# Patient Record
Sex: FEMALE | Race: WHITE
[De-identification: names, ages, dates, MRNs, and addresses within clinical notes are randomized per-mention and may not be internally consistent; named-entity substitution may affect disease eponyms.]

---

## 2018-06-21 ENCOUNTER — HOSPITAL ENCOUNTER (INPATIENT)
Dept: HOSPITAL 54 - GPS | Age: 78
LOS: 19 days | Discharge: NURSING, REHAB OR LONG-TERM CARE | DRG: 885 | End: 2018-07-10
Attending: PSYCHIATRY & NEUROLOGY | Admitting: PSYCHIATRY & NEUROLOGY
Payer: MEDICARE

## 2018-06-21 VITALS — DIASTOLIC BLOOD PRESSURE: 74 MMHG | SYSTOLIC BLOOD PRESSURE: 135 MMHG

## 2018-06-21 VITALS — HEIGHT: 62 IN | BODY MASS INDEX: 22.82 KG/M2 | WEIGHT: 124 LBS

## 2018-06-21 VITALS — SYSTOLIC BLOOD PRESSURE: 163 MMHG | DIASTOLIC BLOOD PRESSURE: 80 MMHG

## 2018-06-21 VITALS — DIASTOLIC BLOOD PRESSURE: 88 MMHG | SYSTOLIC BLOOD PRESSURE: 152 MMHG

## 2018-06-21 VITALS — SYSTOLIC BLOOD PRESSURE: 142 MMHG | DIASTOLIC BLOOD PRESSURE: 82 MMHG

## 2018-06-21 DIAGNOSIS — Z73.6: ICD-10-CM

## 2018-06-21 DIAGNOSIS — E87.6: ICD-10-CM

## 2018-06-21 DIAGNOSIS — N39.0: ICD-10-CM

## 2018-06-21 DIAGNOSIS — R62.7: ICD-10-CM

## 2018-06-21 DIAGNOSIS — R45.851: ICD-10-CM

## 2018-06-21 DIAGNOSIS — K59.00: ICD-10-CM

## 2018-06-21 DIAGNOSIS — R63.3: ICD-10-CM

## 2018-06-21 DIAGNOSIS — F41.9: ICD-10-CM

## 2018-06-21 DIAGNOSIS — Z90.710: ICD-10-CM

## 2018-06-21 DIAGNOSIS — M19.90: ICD-10-CM

## 2018-06-21 DIAGNOSIS — Z82.49: ICD-10-CM

## 2018-06-21 DIAGNOSIS — Z91.14: ICD-10-CM

## 2018-06-21 DIAGNOSIS — E78.5: ICD-10-CM

## 2018-06-21 DIAGNOSIS — F29: ICD-10-CM

## 2018-06-21 DIAGNOSIS — D64.9: ICD-10-CM

## 2018-06-21 DIAGNOSIS — Z82.3: ICD-10-CM

## 2018-06-21 DIAGNOSIS — F33.2: Primary | ICD-10-CM

## 2018-06-21 DIAGNOSIS — I10: ICD-10-CM

## 2018-06-21 DIAGNOSIS — G31.84: ICD-10-CM

## 2018-06-21 LAB
ALBUMIN SERPL BCP-MCNC: 3.6 G/DL (ref 3.4–5)
ALP SERPL-CCNC: 41 U/L (ref 46–116)
ALT SERPL W P-5'-P-CCNC: 21 U/L (ref 12–78)
AST SERPL W P-5'-P-CCNC: 19 U/L (ref 15–37)
BILIRUB SERPL-MCNC: 0.8 MG/DL (ref 0.2–1)
BUN SERPL-MCNC: 16 MG/DL (ref 7–18)
CALCIUM SERPL-MCNC: 8.9 MG/DL (ref 8.5–10.1)
CHLORIDE SERPL-SCNC: 98 MMOL/L (ref 98–107)
CO2 SERPL-SCNC: 20 MMOL/L (ref 21–32)
CREAT SERPL-MCNC: 0.8 MG/DL (ref 0.6–1.3)
GLUCOSE SERPL-MCNC: 63 MG/DL (ref 74–106)
POTASSIUM SERPL-SCNC: 3.9 MMOL/L (ref 3.5–5.1)
PROT SERPL-MCNC: 7 G/DL (ref 6.4–8.2)
SODIUM SERPL-SCNC: 135 MMOL/L (ref 136–145)

## 2018-06-21 PROCEDURE — Z7610: HCPCS

## 2018-06-21 RX ADMIN — QUETIAPINE SCH MG: 25 TABLET, FILM COATED ORAL at 21:09

## 2018-06-21 RX ADMIN — MIRTAZAPINE SCH MG: 15 TABLET, FILM COATED ORAL at 21:09

## 2018-06-21 NOTE — NUR
GPS ADMISSION NOTE, RECEIVED PATIENT FROM [place] / [facility]. PATIENT ARRIVED ON THIS UNIT AT 0430 
VIA STRETCHER  WITH 2 EMT ESCORTS.  PATIENT ADMITTED ON A 5150 HOLD FOR DTS AND GD.  PER 
HOLD PATIENT HAS SIGNIFICANT DEPRESSION WITH THOUGHTS OF SUICIDE WITH A PLAN TO CUT HER 
WRISTS.  PATIENT ALSO REPORTS THAT SHE HAS BEEN UNABLE TO GET OUT OF BED, HAS NOT BEEN 
SLEEPING, AND HAS NOT BEEN EATING FOR THE PAST 4-5 DAYS.  PATIENT UNABLE TO PROVIDE A SAFE 
PLAN FOR SELF CARE AT THIS TIME. THE 5150 WAS REVIEWED AND THE DOCUMENTATION IN THE 5150 
HOLD APPEARS TO REFLECT THE PRESENTATION OF THE PATIENT.  UPON FACE TO FACE ASSESSMENT 
PATIENT IS CURRENTLY LYING IN BED AWAKE, HAS NO S/S OR COMPLAINTS OF PAIN AT THIS TIME. 
PATIENT IS DISPLAYING NO S/S OF APPARENT DISTRESS.  PATIENT BREATHING IS UNLABORED WITH 
EQUAL RISE AND FALL OF THE CHEST.  PATIENT IS ALERT AND ORIENTATED X 4 ON ROOM AIR.  PATIENT 
ASSISTED WITH TURING AND REPOSITIONING Q2HR AND PRN FOR COMFORT AND CIRCULATION.  PATIENT 
HAS NO NEEDS AT THIS TIME.  PATIENT IS NOTED TO BEING WITHDRAWN, DEPRESSED, DISHEVELED, 
DISORGANIZED, COOPERATIVE, AND NEEDS REDIRECTION.  PATIENT IS UNDER THE PSYCHIATRIC CARE OF 
DR. GRAVES AND THE MEDICAL CARE OF DR ALONSO .  PATIENT BELONGINGS WERE INVENTORIED AND 
CHECKED FOR CONTRABAND.  ALL CONTRABAND REMOVED AND STORED IN PATIENT HALLWAY LOCKER.  
PATIENT ADVANCED DIRECTIVES PREFERENCE, IMMUNIZATIONS QUESTIONER, NECESSARY PAPERWORK AND, 
SKIN ASSESSMENT COMPLETED.   PATIENT ORIENTATED TO ROOM, FLOOR, AND STAFF WITH ALL QUESTIONS 
ANSWERED.  PATIENT EDUCATED ON THE USE OF THE CALL BELL.  PATIENT REFUSED TO SIGN ALL 
PAPERWORK.  PATIENT BED SIDE RAILS ARE UP X 2 FOR SAFETY.  PATIENT BED IS LOCKED, LOW AND I 
WILL CONTINUE TO MONITOR THIS PATIENT Q 15 MIN WITH THE HELP OF STAFF TO MAINTAIN SAFETY.

## 2018-06-21 NOTE — NUR
SW called the pt's , Jose Sterling ([phone]), and discussed a possible plan of 
action and received the pt's psychiatrist and primary doctor's information.

## 2018-06-22 VITALS — SYSTOLIC BLOOD PRESSURE: 149 MMHG | DIASTOLIC BLOOD PRESSURE: 70 MMHG

## 2018-06-22 VITALS — SYSTOLIC BLOOD PRESSURE: 141 MMHG | DIASTOLIC BLOOD PRESSURE: 77 MMHG

## 2018-06-22 VITALS — SYSTOLIC BLOOD PRESSURE: 135 MMHG | DIASTOLIC BLOOD PRESSURE: 84 MMHG

## 2018-06-22 LAB
CHOLEST SERPL-MCNC: 195 MG/DL (ref ?–200)
HDLC SERPL-MCNC: 43 MG/DL (ref 40–60)
LDLC SERPL DIRECT ASSAY-MCNC: 134 MG/DL (ref 0–99)
TRIGL SERPL-MCNC: 107 MG/DL (ref 30–150)

## 2018-06-22 RX ADMIN — TEMAZEPAM PRN MG: 7.5 CAPSULE ORAL at 22:08

## 2018-06-22 RX ADMIN — QUETIAPINE SCH MG: 25 TABLET, FILM COATED ORAL at 22:07

## 2018-06-22 RX ADMIN — MIRTAZAPINE SCH MG: 15 TABLET, FILM COATED ORAL at 22:07

## 2018-06-23 VITALS — SYSTOLIC BLOOD PRESSURE: 144 MMHG | DIASTOLIC BLOOD PRESSURE: 82 MMHG

## 2018-06-23 VITALS — SYSTOLIC BLOOD PRESSURE: 134 MMHG | DIASTOLIC BLOOD PRESSURE: 75 MMHG

## 2018-06-23 VITALS — SYSTOLIC BLOOD PRESSURE: 137 MMHG | DIASTOLIC BLOOD PRESSURE: 71 MMHG

## 2018-06-23 RX ADMIN — MIRTAZAPINE SCH MG: 15 TABLET, FILM COATED ORAL at 21:27

## 2018-06-23 RX ADMIN — QUETIAPINE SCH MG: 25 TABLET, FILM COATED ORAL at 21:27

## 2018-06-23 NOTE — NUR
AM RN NOTE



Pt refused breakfast and lunch, ate 2 icecreams around 1600. Pt seen and assessed by Dr. Lindquist made aware about pt's poor appetite and depressed mood. Will wait for new orders.

## 2018-06-24 VITALS — DIASTOLIC BLOOD PRESSURE: 78 MMHG | SYSTOLIC BLOOD PRESSURE: 111 MMHG

## 2018-06-24 VITALS — DIASTOLIC BLOOD PRESSURE: 85 MMHG | SYSTOLIC BLOOD PRESSURE: 131 MMHG

## 2018-06-24 VITALS — SYSTOLIC BLOOD PRESSURE: 144 MMHG | DIASTOLIC BLOOD PRESSURE: 71 MMHG

## 2018-06-24 LAB
BASOPHILS # BLD AUTO: 0 /CMM (ref 0–0.2)
BASOPHILS NFR BLD AUTO: 0.4 % (ref 0–2)
BUN SERPL-MCNC: 13 MG/DL (ref 7–18)
CALCIUM SERPL-MCNC: 9.2 MG/DL (ref 8.5–10.1)
CHLORIDE SERPL-SCNC: 102 MMOL/L (ref 98–107)
CO2 SERPL-SCNC: 24 MMOL/L (ref 21–32)
CREAT SERPL-MCNC: 0.8 MG/DL (ref 0.6–1.3)
EOSINOPHIL NFR BLD AUTO: 0.8 % (ref 0–6)
GLUCOSE SERPL-MCNC: 81 MG/DL (ref 74–106)
HCT VFR BLD AUTO: 38 % (ref 33–45)
HGB BLD-MCNC: 13.1 G/DL (ref 11.5–14.8)
LYMPHOCYTES NFR BLD AUTO: 1.2 /CMM (ref 0.8–4.8)
LYMPHOCYTES NFR BLD AUTO: 23.3 % (ref 20–44)
MCH RBC QN AUTO: 33 PG (ref 26–33)
MCHC RBC AUTO-ENTMCNC: 34 G/DL (ref 31–36)
MCV RBC AUTO: 97 FL (ref 82–100)
MONOCYTES NFR BLD AUTO: 0.4 /CMM (ref 0.1–1.3)
MONOCYTES NFR BLD AUTO: 8.8 % (ref 2–12)
NEUTROPHILS # BLD AUTO: 3.3 /CMM (ref 1.8–8.9)
NEUTROPHILS NFR BLD AUTO: 66.7 % (ref 43–81)
PLATELET # BLD AUTO: 252 /CMM (ref 150–450)
POTASSIUM SERPL-SCNC: 3.8 MMOL/L (ref 3.5–5.1)
RBC # BLD AUTO: 3.94 MIL/UL (ref 4–5.2)
RDW COEFFICIENT OF VARIATION: 12.2 (ref 11.5–15)
SODIUM SERPL-SCNC: 137 MMOL/L (ref 136–145)
WBC NRBC COR # BLD AUTO: 4.9 K/UL (ref 4.3–11)

## 2018-06-24 RX ADMIN — MIRTAZAPINE SCH MG: 15 TABLET, FILM COATED ORAL at 21:52

## 2018-06-24 RX ADMIN — QUETIAPINE SCH MG: 25 TABLET, FILM COATED ORAL at 21:52

## 2018-06-24 NOTE — NUR
Pt has been impassive, withdrawn, with flat affect, anhedonic, & hypoverbal. She refused her 
po meds last night stating "They knock me out all day".

## 2018-06-25 VITALS — DIASTOLIC BLOOD PRESSURE: 76 MMHG | SYSTOLIC BLOOD PRESSURE: 126 MMHG

## 2018-06-25 VITALS — SYSTOLIC BLOOD PRESSURE: 140 MMHG | DIASTOLIC BLOOD PRESSURE: 72 MMHG

## 2018-06-25 VITALS — DIASTOLIC BLOOD PRESSURE: 73 MMHG | SYSTOLIC BLOOD PRESSURE: 137 MMHG

## 2018-06-25 RX ADMIN — QUETIAPINE SCH MG: 25 TABLET, FILM COATED ORAL at 21:48

## 2018-06-25 RX ADMIN — MIRTAZAPINE SCH MG: 15 TABLET, FILM COATED ORAL at 21:48

## 2018-06-26 VITALS — SYSTOLIC BLOOD PRESSURE: 129 MMHG | DIASTOLIC BLOOD PRESSURE: 67 MMHG

## 2018-06-26 VITALS — SYSTOLIC BLOOD PRESSURE: 141 MMHG | DIASTOLIC BLOOD PRESSURE: 72 MMHG

## 2018-06-26 VITALS — SYSTOLIC BLOOD PRESSURE: 136 MMHG | DIASTOLIC BLOOD PRESSURE: 74 MMHG

## 2018-06-26 LAB
ALBUMIN SERPL BCP-MCNC: 3.2 G/DL (ref 3.4–5)
ALP SERPL-CCNC: 38 U/L (ref 46–116)
ALT SERPL W P-5'-P-CCNC: 21 U/L (ref 12–78)
AST SERPL W P-5'-P-CCNC: 22 U/L (ref 15–37)
BASOPHILS # BLD AUTO: 0 /CMM (ref 0–0.2)
BASOPHILS NFR BLD AUTO: 0.5 % (ref 0–2)
BILIRUB SERPL-MCNC: 0.7 MG/DL (ref 0.2–1)
BUN SERPL-MCNC: 21 MG/DL (ref 7–18)
CALCIUM SERPL-MCNC: 8.9 MG/DL (ref 8.5–10.1)
CHLORIDE SERPL-SCNC: 102 MMOL/L (ref 98–107)
CO2 SERPL-SCNC: 21 MMOL/L (ref 21–32)
CREAT SERPL-MCNC: 0.8 MG/DL (ref 0.6–1.3)
EOSINOPHIL NFR BLD AUTO: 1.3 % (ref 0–6)
GLUCOSE SERPL-MCNC: 78 MG/DL (ref 74–106)
HCT VFR BLD AUTO: 38 % (ref 33–45)
HGB BLD-MCNC: 13.1 G/DL (ref 11.5–14.8)
LYMPHOCYTES NFR BLD AUTO: 1.3 /CMM (ref 0.8–4.8)
LYMPHOCYTES NFR BLD AUTO: 29.8 % (ref 20–44)
MAGNESIUM SERPL-MCNC: 1.9 MG/DL (ref 1.8–2.4)
MCH RBC QN AUTO: 33 PG (ref 26–33)
MCHC RBC AUTO-ENTMCNC: 34 G/DL (ref 31–36)
MCV RBC AUTO: 98 FL (ref 82–100)
MONOCYTES NFR BLD AUTO: 0.4 /CMM (ref 0.1–1.3)
MONOCYTES NFR BLD AUTO: 9.6 % (ref 2–12)
NEUTROPHILS # BLD AUTO: 2.6 /CMM (ref 1.8–8.9)
NEUTROPHILS NFR BLD AUTO: 58.8 % (ref 43–81)
PLATELET # BLD AUTO: 225 /CMM (ref 150–450)
POTASSIUM SERPL-SCNC: 3.8 MMOL/L (ref 3.5–5.1)
PROT SERPL-MCNC: 6.7 G/DL (ref 6.4–8.2)
RBC # BLD AUTO: 3.93 MIL/UL (ref 4–5.2)
RDW COEFFICIENT OF VARIATION: 12.2 (ref 11.5–15)
SODIUM SERPL-SCNC: 138 MMOL/L (ref 136–145)
WBC NRBC COR # BLD AUTO: 4.4 K/UL (ref 4.3–11)

## 2018-06-26 RX ADMIN — QUETIAPINE SCH MG: 25 TABLET, FILM COATED ORAL at 22:23

## 2018-06-26 RX ADMIN — Medication SCH ML: at 13:00

## 2018-06-26 RX ADMIN — Medication SCH ML: at 17:41

## 2018-06-26 RX ADMIN — Medication SCH ML: at 08:00

## 2018-06-26 RX ADMIN — MIRTAZAPINE SCH MG: 15 TABLET, FILM COATED ORAL at 22:00

## 2018-06-26 RX ADMIN — TEMAZEPAM PRN MG: 7.5 CAPSULE ORAL at 22:27

## 2018-06-26 NOTE — NUR
RIKA called the pt's , Jose Sterling ([phone]), and he agreed for the SW to start 
sending out referrals for when she is ready for discharge.

## 2018-06-27 VITALS — DIASTOLIC BLOOD PRESSURE: 79 MMHG | SYSTOLIC BLOOD PRESSURE: 126 MMHG

## 2018-06-27 VITALS — SYSTOLIC BLOOD PRESSURE: 115 MMHG | DIASTOLIC BLOOD PRESSURE: 61 MMHG

## 2018-06-27 VITALS — DIASTOLIC BLOOD PRESSURE: 81 MMHG | SYSTOLIC BLOOD PRESSURE: 109 MMHG

## 2018-06-27 RX ADMIN — MIRTAZAPINE SCH MG: 15 TABLET, FILM COATED ORAL at 21:22

## 2018-06-27 RX ADMIN — MEGESTROL ACETATE SCH MG: 400 SUSPENSION ORAL at 08:20

## 2018-06-27 RX ADMIN — Medication SCH ML: at 16:48

## 2018-06-27 RX ADMIN — Medication SCH ML: at 08:20

## 2018-06-27 NOTE — NUR
RN NOTES

PT. REFUSED HER REMERON "PT STATED THAT IT GIVES HER A HEADACHE, OFFERED TYLENOL FOR THE 
HEADACHE BUT STILL PT. REFUSED TO TAKE THE MEDICATION

## 2018-06-28 VITALS — DIASTOLIC BLOOD PRESSURE: 78 MMHG | SYSTOLIC BLOOD PRESSURE: 139 MMHG

## 2018-06-28 VITALS — DIASTOLIC BLOOD PRESSURE: 76 MMHG | SYSTOLIC BLOOD PRESSURE: 148 MMHG

## 2018-06-28 VITALS — DIASTOLIC BLOOD PRESSURE: 77 MMHG | SYSTOLIC BLOOD PRESSURE: 135 MMHG

## 2018-06-28 RX ADMIN — Medication SCH ML: at 17:00

## 2018-06-28 RX ADMIN — MEGESTROL ACETATE SCH MG: 400 SUSPENSION ORAL at 08:24

## 2018-06-28 RX ADMIN — QUETIAPINE SCH MG: 25 TABLET, FILM COATED ORAL at 09:11

## 2018-06-28 RX ADMIN — Medication PRN MG: at 11:29

## 2018-06-28 RX ADMIN — Medication PRN MG: at 17:34

## 2018-06-28 RX ADMIN — Medication SCH ML: at 08:00

## 2018-06-28 RX ADMIN — MIRTAZAPINE SCH MG: 15 TABLET, FILM COATED ORAL at 21:43

## 2018-06-28 RX ADMIN — Medication SCH ML: at 08:05

## 2018-06-28 NOTE — NUR
GPS RN NOTES

PATIENT REFUSED REMERON 7.5MG STATES " I DONT TAKE REMERON IT GIVES ME A HEADACHE."

WILL CONTINUE TO MONITOR. DENIES PAIN AT THIS TIME. PATIENT IN BED, COMFORTABLE , LOW BED 
AND LOCKED.

## 2018-06-28 NOTE — NUR
GPS/RN-NOTES

PATIENT REFUSED ENSURE AND BREAKFAST DESPITE EXPLANATIONS OF THE RISK OF NOT EATING OR 
DRINKING. PATIENT STATED" I'M FULL I CANNOT EAT". OFFERED X3.

## 2018-06-28 NOTE — NUR
GPS/RN-NOTES

PATIENT C/O CONSTIPATION FOR FEW DAYS. DULCOLAX 5MG P.O GIVEN AS PRN ORDER. WILL CONT. 
MONITORING FOR EFFECTIVENESS.

## 2018-06-28 NOTE — NUR
GPS/RN-NOTES

 SEEN BY DR. JUDE MARTINEZ WITH VERBAL ORDER OF DULCOLAX 5MG P.O PRN FOR CONSTIPATION,MAY 
REPEAT IF NO BM IN 4HRS. NOTED AND CARRIED OUT.

-------------------------------------------------------------------------------

Addendum: 06/28/18 at 1103 by MALORIE LERMA RN

-------------------------------------------------------------------------------

IN ADDITION TO MY NOTES ABOVE. MD ALSO MADE AWARE OF PATIENT REFUSAL TO EAT BREAKFAST AND 
DRINK ENSURE.

## 2018-06-28 NOTE — NUR
GPS/RN-NOTES



FIRST DULCOLAX WAS NOT EFFECTIVE, 2ND DULCOLAX 5MG P.O GIVEN. WILL CONT. MONITORING FOR 
EFFECTIVENESS. 3

## 2018-06-29 VITALS — SYSTOLIC BLOOD PRESSURE: 124 MMHG | DIASTOLIC BLOOD PRESSURE: 80 MMHG

## 2018-06-29 VITALS — DIASTOLIC BLOOD PRESSURE: 70 MMHG | SYSTOLIC BLOOD PRESSURE: 121 MMHG

## 2018-06-29 VITALS — SYSTOLIC BLOOD PRESSURE: 137 MMHG | DIASTOLIC BLOOD PRESSURE: 76 MMHG

## 2018-06-29 LAB
ALBUMIN SERPL BCP-MCNC: 3.6 G/DL (ref 3.4–5)
ALP SERPL-CCNC: 41 U/L (ref 46–116)
ALT SERPL W P-5'-P-CCNC: 20 U/L (ref 12–78)
AST SERPL W P-5'-P-CCNC: 21 U/L (ref 15–37)
BASOPHILS # BLD AUTO: 0.1 /CMM (ref 0–0.2)
BASOPHILS NFR BLD AUTO: 1.4 % (ref 0–2)
BILIRUB SERPL-MCNC: 0.7 MG/DL (ref 0.2–1)
BUN SERPL-MCNC: 25 MG/DL (ref 7–18)
CALCIUM SERPL-MCNC: 9.3 MG/DL (ref 8.5–10.1)
CHLORIDE SERPL-SCNC: 102 MMOL/L (ref 98–107)
CO2 SERPL-SCNC: 22 MMOL/L (ref 21–32)
CREAT SERPL-MCNC: 1 MG/DL (ref 0.6–1.3)
EOSINOPHIL NFR BLD AUTO: 0.4 % (ref 0–6)
GLUCOSE SERPL-MCNC: 96 MG/DL (ref 74–106)
HCT VFR BLD AUTO: 40 % (ref 33–45)
HGB BLD-MCNC: 13.4 G/DL (ref 11.5–14.8)
LYMPHOCYTES NFR BLD AUTO: 1.2 /CMM (ref 0.8–4.8)
LYMPHOCYTES NFR BLD AUTO: 16.6 % (ref 20–44)
MAGNESIUM SERPL-MCNC: 1.9 MG/DL (ref 1.8–2.4)
MCH RBC QN AUTO: 33 PG (ref 26–33)
MCHC RBC AUTO-ENTMCNC: 34 G/DL (ref 31–36)
MCV RBC AUTO: 98 FL (ref 82–100)
MONOCYTES NFR BLD AUTO: 0.5 /CMM (ref 0.1–1.3)
MONOCYTES NFR BLD AUTO: 7 % (ref 2–12)
NEUTROPHILS # BLD AUTO: 5.3 /CMM (ref 1.8–8.9)
NEUTROPHILS NFR BLD AUTO: 74.6 % (ref 43–81)
PHOSPHATE SERPL-MCNC: 3.4 MG/DL (ref 2.5–4.9)
PLATELET # BLD AUTO: 219 /CMM (ref 150–450)
POTASSIUM SERPL-SCNC: 3.3 MMOL/L (ref 3.5–5.1)
PROT SERPL-MCNC: 7.2 G/DL (ref 6.4–8.2)
RBC # BLD AUTO: 4.09 MIL/UL (ref 4–5.2)
RDW COEFFICIENT OF VARIATION: 12.7 (ref 11.5–15)
SODIUM SERPL-SCNC: 140 MMOL/L (ref 136–145)
WBC NRBC COR # BLD AUTO: 7.1 K/UL (ref 4.3–11)

## 2018-06-29 RX ADMIN — OLANZAPINE SCH MG: 2.5 TABLET ORAL at 17:45

## 2018-06-29 RX ADMIN — MEGESTROL ACETATE SCH MG: 400 SUSPENSION ORAL at 11:56

## 2018-06-29 RX ADMIN — QUETIAPINE SCH MG: 25 TABLET, FILM COATED ORAL at 11:57

## 2018-06-29 RX ADMIN — Medication SCH ML: at 17:00

## 2018-06-29 RX ADMIN — QUETIAPINE SCH MG: 25 TABLET, FILM COATED ORAL at 09:00

## 2018-06-29 RX ADMIN — VENLAFAXINE HYDROCHLORIDE SCH MG: 75 CAPSULE, EXTENDED RELEASE ORAL at 09:00

## 2018-06-29 RX ADMIN — MEGESTROL ACETATE SCH MG: 400 SUSPENSION ORAL at 09:00

## 2018-06-29 RX ADMIN — Medication SCH ML: at 08:00

## 2018-06-29 RX ADMIN — VENLAFAXINE HYDROCHLORIDE SCH MG: 75 CAPSULE, EXTENDED RELEASE ORAL at 11:56

## 2018-06-29 NOTE — NUR
RN NOTES

 PATIENT REFUSED TO EAT, BUT TAKEN SCHEDULED MEDICATION, AND DRINKING  WATER, ENCOURAGED TO 
INCREASE FLUID INTAKE. V/S TAKEN STABLE, CALL BELL NEAR TO REACH, SAFETY PRECAUTION 
MAINTAINED ALL THE TIME.

## 2018-06-29 NOTE — NUR
RN NOTES

 PATIENT CHANGE HER MIND AND AGREES TAKEN MORNING SCHEDULED MEDICATION, AFTER SHOWER. 
ENCOURAGED TO INCREASE FLUID INTAKE, ALSO GOING TO COLLECT UA SPECIMEN.

## 2018-06-30 VITALS — SYSTOLIC BLOOD PRESSURE: 124 MMHG | DIASTOLIC BLOOD PRESSURE: 62 MMHG

## 2018-06-30 VITALS — SYSTOLIC BLOOD PRESSURE: 136 MMHG | DIASTOLIC BLOOD PRESSURE: 77 MMHG

## 2018-06-30 VITALS — SYSTOLIC BLOOD PRESSURE: 164 MMHG | DIASTOLIC BLOOD PRESSURE: 77 MMHG

## 2018-06-30 LAB
APPEARANCE UR: (no result)
BILIRUB UR QL STRIP: (no result)
BUN SERPL-MCNC: 22 MG/DL (ref 7–18)
CALCIUM SERPL-MCNC: 9.4 MG/DL (ref 8.5–10.1)
CHLORIDE SERPL-SCNC: 100 MMOL/L (ref 98–107)
CO2 SERPL-SCNC: 22 MMOL/L (ref 21–32)
COLOR UR: (no result)
CREAT SERPL-MCNC: 1 MG/DL (ref 0.6–1.3)
GLUCOSE SERPL-MCNC: 84 MG/DL (ref 74–106)
GLUCOSE UR STRIP-MCNC: NEGATIVE MG/DL
HGB UR QL STRIP: NEGATIVE ERY/UL
KETONES UR STRIP-MCNC: (no result) MG/DL
LEUKOCYTE ESTERASE UR QL STRIP: (no result)
NITRITE UR QL STRIP: NEGATIVE
PH UR STRIP: 6 [PH] (ref 5–8)
POTASSIUM SERPL-SCNC: 3.9 MMOL/L (ref 3.5–5.1)
PROT UR QL STRIP: (no result) MG/DL
RBC #/AREA URNS HPF: (no result) /HPF (ref 0–2)
SODIUM SERPL-SCNC: 137 MMOL/L (ref 136–145)
UROBILINOGEN UR STRIP-MCNC: 0.2 EU/DL
WBC #/AREA URNS HPF: (no result) /HPF (ref 0–3)

## 2018-06-30 RX ADMIN — VENLAFAXINE HYDROCHLORIDE SCH MG: 75 CAPSULE, EXTENDED RELEASE ORAL at 08:44

## 2018-06-30 RX ADMIN — MEGESTROL ACETATE SCH MG: 400 SUSPENSION ORAL at 08:41

## 2018-06-30 RX ADMIN — OLANZAPINE SCH MG: 2.5 TABLET ORAL at 12:05

## 2018-06-30 RX ADMIN — Medication SCH ML: at 08:00

## 2018-06-30 RX ADMIN — OLANZAPINE SCH MG: 2.5 TABLET ORAL at 08:41

## 2018-06-30 RX ADMIN — OLANZAPINE SCH MG: 2.5 TABLET ORAL at 16:05

## 2018-06-30 RX ADMIN — Medication PRN MG: at 16:05

## 2018-06-30 RX ADMIN — Medication SCH ML: at 16:05

## 2018-07-01 VITALS — DIASTOLIC BLOOD PRESSURE: 68 MMHG | SYSTOLIC BLOOD PRESSURE: 143 MMHG

## 2018-07-01 VITALS — SYSTOLIC BLOOD PRESSURE: 132 MMHG | DIASTOLIC BLOOD PRESSURE: 73 MMHG

## 2018-07-01 VITALS — DIASTOLIC BLOOD PRESSURE: 73 MMHG | SYSTOLIC BLOOD PRESSURE: 132 MMHG

## 2018-07-01 VITALS — SYSTOLIC BLOOD PRESSURE: 151 MMHG | DIASTOLIC BLOOD PRESSURE: 81 MMHG

## 2018-07-01 LAB
ALBUMIN SERPL BCP-MCNC: 3.3 G/DL (ref 3.4–5)
ALP SERPL-CCNC: 34 U/L (ref 46–116)
ALT SERPL W P-5'-P-CCNC: 44 U/L (ref 12–78)
AST SERPL W P-5'-P-CCNC: 34 U/L (ref 15–37)
BILIRUB SERPL-MCNC: 0.5 MG/DL (ref 0.2–1)
BUN SERPL-MCNC: 20 MG/DL (ref 7–18)
CALCIUM SERPL-MCNC: 8.8 MG/DL (ref 8.5–10.1)
CHLORIDE SERPL-SCNC: 104 MMOL/L (ref 98–107)
CO2 SERPL-SCNC: 24 MMOL/L (ref 21–32)
CREAT SERPL-MCNC: 0.8 MG/DL (ref 0.6–1.3)
GLUCOSE SERPL-MCNC: 83 MG/DL (ref 74–106)
MAGNESIUM SERPL-MCNC: 2 MG/DL (ref 1.8–2.4)
POTASSIUM SERPL-SCNC: 3.8 MMOL/L (ref 3.5–5.1)
PROT SERPL-MCNC: 6.7 G/DL (ref 6.4–8.2)
SODIUM SERPL-SCNC: 141 MMOL/L (ref 136–145)

## 2018-07-01 RX ADMIN — Medication PRN MG: at 22:55

## 2018-07-01 RX ADMIN — OLANZAPINE SCH MG: 2.5 TABLET ORAL at 17:00

## 2018-07-01 RX ADMIN — Medication SCH ML: at 17:00

## 2018-07-01 RX ADMIN — MEGESTROL ACETATE SCH MG: 400 SUSPENSION ORAL at 09:00

## 2018-07-01 RX ADMIN — OLANZAPINE SCH MG: 2.5 TABLET ORAL at 13:00

## 2018-07-01 RX ADMIN — OLANZAPINE SCH MG: 2.5 TABLET ORAL at 09:00

## 2018-07-01 RX ADMIN — Medication SCH ML: at 08:00

## 2018-07-01 NOTE — NUR
GPS/RN-NOTES

PATIENT STRONGLY REFUSED TO EAT HER BREAKFAST AND ALL 0900AM P.O  MEDICATIONS DESPITE 
EXPLANATIONS RISK AND BENEFITS OF EATING AND TAKING MEDICATIONS. DR. ADAMS COVERING FOR 
DR. GRAVES TODAY MADE AWARE OF PATIENT REFUSAL . PATIENT STATED" I CANNOT TAKE ANY 
MEDICATIONS ANYMORE". OFFERED X3.

## 2018-07-01 NOTE — NUR
GPS/RN-NOTES

 PATIENT REFUSED ALL 1700  MEDICATION INCLUDING ENSURE. STATED" I'M FULL I DON'T WANT TO EAT 
AND  TAKE MEDICATIONS". OFFERED X3.

## 2018-07-02 VITALS — SYSTOLIC BLOOD PRESSURE: 128 MMHG | DIASTOLIC BLOOD PRESSURE: 77 MMHG

## 2018-07-02 VITALS — SYSTOLIC BLOOD PRESSURE: 132 MMHG | DIASTOLIC BLOOD PRESSURE: 74 MMHG

## 2018-07-02 VITALS — DIASTOLIC BLOOD PRESSURE: 72 MMHG | SYSTOLIC BLOOD PRESSURE: 130 MMHG

## 2018-07-02 LAB
BASOPHILS # BLD AUTO: 0.1 /CMM (ref 0–0.2)
BASOPHILS NFR BLD AUTO: 0.8 % (ref 0–2)
BUN SERPL-MCNC: 21 MG/DL (ref 7–18)
CALCIUM SERPL-MCNC: 9.2 MG/DL (ref 8.5–10.1)
CHLORIDE SERPL-SCNC: 105 MMOL/L (ref 98–107)
CO2 SERPL-SCNC: 20 MMOL/L (ref 21–32)
CREAT SERPL-MCNC: 0.8 MG/DL (ref 0.6–1.3)
EOSINOPHIL NFR BLD AUTO: 1 % (ref 0–6)
GLUCOSE SERPL-MCNC: 74 MG/DL (ref 74–106)
HCT VFR BLD AUTO: 38 % (ref 33–45)
HGB BLD-MCNC: 13.1 G/DL (ref 11.5–14.8)
LYMPHOCYTES NFR BLD AUTO: 1.6 /CMM (ref 0.8–4.8)
LYMPHOCYTES NFR BLD AUTO: 25 % (ref 20–44)
MCH RBC QN AUTO: 33 PG (ref 26–33)
MCHC RBC AUTO-ENTMCNC: 34 G/DL (ref 31–36)
MCV RBC AUTO: 95 FL (ref 82–100)
MONOCYTES NFR BLD AUTO: 0.5 /CMM (ref 0.1–1.3)
MONOCYTES NFR BLD AUTO: 8.5 % (ref 2–12)
NEUTROPHILS # BLD AUTO: 4 /CMM (ref 1.8–8.9)
NEUTROPHILS NFR BLD AUTO: 64.7 % (ref 43–81)
PLATELET # BLD AUTO: 208 /CMM (ref 150–450)
POTASSIUM SERPL-SCNC: 4 MMOL/L (ref 3.5–5.1)
RBC # BLD AUTO: 4.02 MIL/UL (ref 4–5.2)
RDW COEFFICIENT OF VARIATION: 12.2 (ref 11.5–15)
SODIUM SERPL-SCNC: 138 MMOL/L (ref 136–145)
WBC NRBC COR # BLD AUTO: 6.3 K/UL (ref 4.3–11)

## 2018-07-02 RX ADMIN — OLANZAPINE SCH MG: 2.5 TABLET ORAL at 12:45

## 2018-07-02 RX ADMIN — MEGESTROL ACETATE SCH MG: 400 SUSPENSION ORAL at 08:39

## 2018-07-02 RX ADMIN — MEGESTROL ACETATE SCH MG: 400 SUSPENSION ORAL at 09:00

## 2018-07-02 RX ADMIN — OLANZAPINE SCH MG: 2.5 TABLET ORAL at 16:30

## 2018-07-02 RX ADMIN — OLANZAPINE SCH MG: 2.5 TABLET ORAL at 08:38

## 2018-07-02 RX ADMIN — Medication SCH ML: at 08:41

## 2018-07-02 RX ADMIN — Medication SCH ML: at 16:28

## 2018-07-03 VITALS — DIASTOLIC BLOOD PRESSURE: 78 MMHG | SYSTOLIC BLOOD PRESSURE: 136 MMHG

## 2018-07-03 VITALS — DIASTOLIC BLOOD PRESSURE: 87 MMHG | SYSTOLIC BLOOD PRESSURE: 125 MMHG

## 2018-07-03 VITALS — DIASTOLIC BLOOD PRESSURE: 81 MMHG | SYSTOLIC BLOOD PRESSURE: 148 MMHG

## 2018-07-03 RX ADMIN — SULFAMETHOXAZOLE AND TRIMETHOPRIM SCH UDTAB: 800; 160 TABLET ORAL at 12:07

## 2018-07-03 RX ADMIN — OLANZAPINE SCH MG: 2.5 TABLET ORAL at 12:07

## 2018-07-03 RX ADMIN — Medication SCH ML: at 08:00

## 2018-07-03 RX ADMIN — Medication PRN MG: at 16:04

## 2018-07-03 RX ADMIN — Medication SCH ML: at 16:04

## 2018-07-03 RX ADMIN — OLANZAPINE SCH MG: 2.5 TABLET ORAL at 16:04

## 2018-07-03 RX ADMIN — SULFAMETHOXAZOLE AND TRIMETHOPRIM SCH UDTAB: 800; 160 TABLET ORAL at 20:51

## 2018-07-03 RX ADMIN — MAGNESIUM HYDROXIDE PRN ML: 400 SUSPENSION ORAL at 16:04

## 2018-07-03 RX ADMIN — MEGESTROL ACETATE SCH MG: 400 SUSPENSION ORAL at 08:33

## 2018-07-03 RX ADMIN — OLANZAPINE SCH MG: 2.5 TABLET ORAL at 08:33

## 2018-07-03 NOTE — NUR
GPS RN NOTE:

PT REPORTED HAD A SMALL BM PT N.P. REPEAT MOM PRN,SUPPOSITORY, DULCOLAX  X1 TOMORROW WILL 
CONTINUE MONITORING.

## 2018-07-03 NOTE — NUR
patient took zyprexa 2.5 mg one dose last night no complain of discomfort denies any pain 
respiratory even unlabored, vital sign stable will continues to monitor the patient for 
safety and fall.

## 2018-07-03 NOTE — NUR
GPS RN NOTE:



PATIENT RESTING IN BED, NO ACUTE DISTRESS NOTED. PATIENT ISOLATED TO ROOM, BUT CALM AND 
COOPERATIVE. WILL CONTINUE TO MONITOR.

## 2018-07-04 VITALS — DIASTOLIC BLOOD PRESSURE: 97 MMHG | SYSTOLIC BLOOD PRESSURE: 132 MMHG

## 2018-07-04 VITALS — DIASTOLIC BLOOD PRESSURE: 95 MMHG | SYSTOLIC BLOOD PRESSURE: 129 MMHG

## 2018-07-04 VITALS — SYSTOLIC BLOOD PRESSURE: 125 MMHG | DIASTOLIC BLOOD PRESSURE: 68 MMHG

## 2018-07-04 RX ADMIN — Medication PRN MG: at 12:38

## 2018-07-04 RX ADMIN — MIRTAZAPINE SCH MG: 15 TABLET, FILM COATED ORAL at 21:32

## 2018-07-04 RX ADMIN — MEGESTROL ACETATE SCH MG: 400 SUSPENSION ORAL at 08:51

## 2018-07-04 RX ADMIN — MAGNESIUM HYDROXIDE PRN ML: 400 SUSPENSION ORAL at 12:38

## 2018-07-04 RX ADMIN — OLANZAPINE SCH MG: 2.5 TABLET ORAL at 16:32

## 2018-07-04 RX ADMIN — Medication SCH ML: at 08:00

## 2018-07-04 RX ADMIN — Medication SCH ML: at 16:31

## 2018-07-04 RX ADMIN — SULFAMETHOXAZOLE AND TRIMETHOPRIM SCH UDTAB: 800; 160 TABLET ORAL at 08:51

## 2018-07-04 RX ADMIN — OLANZAPINE SCH MG: 2.5 TABLET ORAL at 08:51

## 2018-07-04 RX ADMIN — SULFAMETHOXAZOLE AND TRIMETHOPRIM SCH UDTAB: 800; 160 TABLET ORAL at 21:33

## 2018-07-04 RX ADMIN — OLANZAPINE SCH MG: 2.5 TABLET ORAL at 12:54

## 2018-07-04 NOTE — NUR
GPS/RN-NOTES

AMANDA ALONSO SEEN THE PATIENT EARLIER WITH VERBAL ORDER TO GIVE DULCOLAX TABLET ,MOM AND 
FLEET ENEMA. ALL ORDERS WAS GIVEN TO THE PATIENT.

## 2018-07-05 VITALS — SYSTOLIC BLOOD PRESSURE: 138 MMHG | DIASTOLIC BLOOD PRESSURE: 72 MMHG

## 2018-07-05 VITALS — SYSTOLIC BLOOD PRESSURE: 129 MMHG | DIASTOLIC BLOOD PRESSURE: 66 MMHG

## 2018-07-05 VITALS — DIASTOLIC BLOOD PRESSURE: 63 MMHG | SYSTOLIC BLOOD PRESSURE: 129 MMHG

## 2018-07-05 RX ADMIN — MEGESTROL ACETATE SCH MG: 400 SUSPENSION ORAL at 08:29

## 2018-07-05 RX ADMIN — Medication SCH ML: at 08:29

## 2018-07-05 RX ADMIN — Medication SCH ML: at 16:30

## 2018-07-05 RX ADMIN — SULFAMETHOXAZOLE AND TRIMETHOPRIM SCH UDTAB: 800; 160 TABLET ORAL at 21:19

## 2018-07-05 RX ADMIN — MIRTAZAPINE SCH MG: 15 TABLET, FILM COATED ORAL at 21:19

## 2018-07-05 RX ADMIN — SULFAMETHOXAZOLE AND TRIMETHOPRIM SCH UDTAB: 800; 160 TABLET ORAL at 08:30

## 2018-07-05 RX ADMIN — OLANZAPINE SCH MG: 2.5 TABLET ORAL at 12:44

## 2018-07-05 RX ADMIN — OLANZAPINE SCH MG: 2.5 TABLET ORAL at 08:29

## 2018-07-05 RX ADMIN — OLANZAPINE SCH MG: 2.5 TABLET ORAL at 16:28

## 2018-07-05 NOTE — NUR
GPS RN NOTE:  RECEIVED PATIENT ALERT AND ORIENTED X 2, RESTING IN BED, WITHOUT COMPLAINT AND 
NO ACUTE DISTRESS NOTED. PATIENT ISOLATED TO ROOM, APPEARS SAD, BUT CALM AND COOPERATIVE. 
DENIES SI, HI.  SIDE RAILS UP X 2 FOR SAFETY, WILL CONTINUE TO MONITOR Q15 MINUTES.

## 2018-07-05 NOTE — NUR
IRKA called the pt's , Jose Sterling ([phone]), and he was not content with the 
fact that his wife is being discharged tomorrow.

## 2018-07-06 VITALS — DIASTOLIC BLOOD PRESSURE: 52 MMHG | SYSTOLIC BLOOD PRESSURE: 125 MMHG

## 2018-07-06 VITALS — SYSTOLIC BLOOD PRESSURE: 124 MMHG | DIASTOLIC BLOOD PRESSURE: 74 MMHG

## 2018-07-06 VITALS — SYSTOLIC BLOOD PRESSURE: 107 MMHG | DIASTOLIC BLOOD PRESSURE: 56 MMHG

## 2018-07-06 RX ADMIN — OLANZAPINE SCH MG: 2.5 TABLET ORAL at 09:30

## 2018-07-06 RX ADMIN — SULFAMETHOXAZOLE AND TRIMETHOPRIM SCH UDTAB: 800; 160 TABLET ORAL at 09:30

## 2018-07-06 RX ADMIN — OLANZAPINE SCH MG: 2.5 TABLET ORAL at 16:40

## 2018-07-06 RX ADMIN — OLANZAPINE SCH MG: 2.5 TABLET ORAL at 18:37

## 2018-07-06 RX ADMIN — SULFAMETHOXAZOLE AND TRIMETHOPRIM SCH UDTAB: 800; 160 TABLET ORAL at 20:25

## 2018-07-06 RX ADMIN — Medication SCH ML: at 09:41

## 2018-07-06 RX ADMIN — Medication SCH ML: at 17:06

## 2018-07-06 RX ADMIN — MEGESTROL ACETATE SCH MG: 400 SUSPENSION ORAL at 09:00

## 2018-07-06 RX ADMIN — MAGNESIUM HYDROXIDE SCH ML: 400 SUSPENSION ORAL at 16:40

## 2018-07-06 RX ADMIN — MIRTAZAPINE SCH MG: 15 TABLET, FILM COATED ORAL at 21:54

## 2018-07-06 NOTE — NUR
RIKA called the pt's , Jose Sterling ([phone]), and informed him that the pt will 
not be discharged today.

## 2018-07-06 NOTE — NUR
SW spoke to Dr. Lindquist who is covering for Dr. Rios and decided to push the discharge 
back due to the 's disapproval.

## 2018-07-07 VITALS — DIASTOLIC BLOOD PRESSURE: 73 MMHG | SYSTOLIC BLOOD PRESSURE: 133 MMHG

## 2018-07-07 VITALS — DIASTOLIC BLOOD PRESSURE: 66 MMHG | SYSTOLIC BLOOD PRESSURE: 125 MMHG

## 2018-07-07 VITALS — DIASTOLIC BLOOD PRESSURE: 63 MMHG | SYSTOLIC BLOOD PRESSURE: 111 MMHG

## 2018-07-07 RX ADMIN — Medication SCH ML: at 16:25

## 2018-07-07 RX ADMIN — MEGESTROL ACETATE SCH MG: 400 SUSPENSION ORAL at 09:00

## 2018-07-07 RX ADMIN — SULFAMETHOXAZOLE AND TRIMETHOPRIM SCH UDTAB: 800; 160 TABLET ORAL at 21:49

## 2018-07-07 RX ADMIN — MIRTAZAPINE SCH MG: 15 TABLET, FILM COATED ORAL at 21:49

## 2018-07-07 RX ADMIN — SULFAMETHOXAZOLE AND TRIMETHOPRIM SCH UDTAB: 800; 160 TABLET ORAL at 09:46

## 2018-07-07 RX ADMIN — OLANZAPINE SCH MG: 2.5 TABLET ORAL at 16:25

## 2018-07-07 RX ADMIN — MAGNESIUM HYDROXIDE SCH ML: 400 SUSPENSION ORAL at 16:00

## 2018-07-07 RX ADMIN — Medication SCH ML: at 08:42

## 2018-07-07 RX ADMIN — OLANZAPINE SCH MG: 2.5 TABLET ORAL at 09:47

## 2018-07-07 RX ADMIN — MAGNESIUM HYDROXIDE SCH ML: 400 SUSPENSION ORAL at 03:16

## 2018-07-07 RX ADMIN — Medication SCH MG: at 09:46

## 2018-07-07 NOTE — NUR
gps rn note:



refused MOM, explained the risk and benefits x 3 attempts, patient still refused. Will 
continue to monitor [id number] for safety

## 2018-07-07 NOTE — NUR
GPS/RN PT REFUSED MEGESTROL PO. TOOK THE REST OF MEDICATIONS. POOR FOOD INTAKE NOTED. 
ENCOURAGED TO EAT

## 2018-07-08 VITALS — DIASTOLIC BLOOD PRESSURE: 62 MMHG | SYSTOLIC BLOOD PRESSURE: 110 MMHG

## 2018-07-08 VITALS — SYSTOLIC BLOOD PRESSURE: 143 MMHG | DIASTOLIC BLOOD PRESSURE: 75 MMHG

## 2018-07-08 VITALS — SYSTOLIC BLOOD PRESSURE: 124 MMHG | DIASTOLIC BLOOD PRESSURE: 78 MMHG

## 2018-07-08 VITALS — SYSTOLIC BLOOD PRESSURE: 104 MMHG | DIASTOLIC BLOOD PRESSURE: 50 MMHG

## 2018-07-08 RX ADMIN — Medication SCH ML: at 16:09

## 2018-07-08 RX ADMIN — OLANZAPINE SCH MG: 2.5 TABLET ORAL at 16:08

## 2018-07-08 RX ADMIN — SULFAMETHOXAZOLE AND TRIMETHOPRIM SCH UDTAB: 800; 160 TABLET ORAL at 21:20

## 2018-07-08 RX ADMIN — OLANZAPINE SCH MG: 2.5 TABLET ORAL at 09:39

## 2018-07-08 RX ADMIN — MAGNESIUM HYDROXIDE SCH ML: 400 SUSPENSION ORAL at 04:00

## 2018-07-08 RX ADMIN — MAGNESIUM HYDROXIDE SCH ML: 400 SUSPENSION ORAL at 16:00

## 2018-07-08 RX ADMIN — Medication SCH ML: at 07:40

## 2018-07-08 RX ADMIN — Medication SCH MG: at 09:39

## 2018-07-08 RX ADMIN — SULFAMETHOXAZOLE AND TRIMETHOPRIM SCH UDTAB: 800; 160 TABLET ORAL at 09:39

## 2018-07-08 RX ADMIN — MIRTAZAPINE SCH MG: 15 TABLET, FILM COATED ORAL at 21:21

## 2018-07-08 RX ADMIN — MEGESTROL ACETATE SCH MG: 400 SUSPENSION ORAL at 09:00

## 2018-07-09 VITALS — SYSTOLIC BLOOD PRESSURE: 105 MMHG | DIASTOLIC BLOOD PRESSURE: 69 MMHG

## 2018-07-09 VITALS — SYSTOLIC BLOOD PRESSURE: 121 MMHG | DIASTOLIC BLOOD PRESSURE: 64 MMHG

## 2018-07-09 VITALS — SYSTOLIC BLOOD PRESSURE: 141 MMHG | DIASTOLIC BLOOD PRESSURE: 74 MMHG

## 2018-07-09 RX ADMIN — MIRTAZAPINE SCH MG: 15 TABLET, FILM COATED ORAL at 21:04

## 2018-07-09 RX ADMIN — OLANZAPINE SCH MG: 2.5 TABLET ORAL at 16:38

## 2018-07-09 RX ADMIN — SULFAMETHOXAZOLE AND TRIMETHOPRIM SCH UDTAB: 800; 160 TABLET ORAL at 08:48

## 2018-07-09 RX ADMIN — MEGESTROL ACETATE SCH MG: 400 SUSPENSION ORAL at 08:48

## 2018-07-09 RX ADMIN — Medication SCH ML: at 17:21

## 2018-07-09 RX ADMIN — MAGNESIUM HYDROXIDE SCH ML: 400 SUSPENSION ORAL at 05:11

## 2018-07-09 RX ADMIN — SULFAMETHOXAZOLE AND TRIMETHOPRIM SCH UDTAB: 800; 160 TABLET ORAL at 21:04

## 2018-07-09 RX ADMIN — Medication SCH MG: at 08:48

## 2018-07-09 RX ADMIN — Medication SCH ML: at 08:00

## 2018-07-09 RX ADMIN — MAGNESIUM HYDROXIDE SCH ML: 400 SUSPENSION ORAL at 16:39

## 2018-07-09 RX ADMIN — OLANZAPINE SCH MG: 2.5 TABLET ORAL at 08:48

## 2018-07-09 NOTE — NUR
RIKA called the pt's , Jose Sterling ([phone]), and left a message informing him 
that a Medicare Letter of Denial has been issued and dated for tomorrow. The SW informed him 
that he can appeal it and that the information is on the letter so if he could provide a fax 
number it could be sent to him. RIKA also stated that the pt's stay will no longer be covered 
due to the letter.

## 2018-07-10 VITALS — DIASTOLIC BLOOD PRESSURE: 69 MMHG | SYSTOLIC BLOOD PRESSURE: 135 MMHG

## 2018-07-10 RX ADMIN — MAGNESIUM HYDROXIDE SCH ML: 400 SUSPENSION ORAL at 03:25

## 2018-07-10 RX ADMIN — Medication SCH ML: at 08:59

## 2018-07-10 RX ADMIN — MEGESTROL ACETATE SCH MG: 400 SUSPENSION ORAL at 08:48

## 2018-07-10 RX ADMIN — Medication SCH MG: at 08:49

## 2018-07-10 RX ADMIN — OLANZAPINE SCH MG: 2.5 TABLET ORAL at 08:48

## 2018-07-10 NOTE — NUR
Discharge Note: Pt was discharged to [facility] ([facility]) located at [address] 
[place] [address]; [phone]). Pt was transported via [facility] 
(Trip #[id number]) at 12PM. Pts , Dr. Jose Sterling ([phone]), was notified and 
approved of this placement. Upon discharge, the pt appeared to have a depressed mood and 
flat affect. Pt was anxious about how this placement was going to be paid for regardless of 
the [facility] multiple attempts at reassuring her that her insurance is covering the costs. Upon 
discharge, the pt denied having any hallucinations and denied both suicidal and homicidal 
ideation as well. Pt will be under the care of psychiatrist, Dr. Rios, located at [address] 
[place] [address]; [phone]) and internist, Dr. Mulligan, located at 
[address] [place] [address]; [phone]).

## 2018-07-10 NOTE — NUR
RIKA faxed updated notes to CJ from [facility] at his fax number of [phone] which 
denied suicidal ideation.

## 2018-07-10 NOTE — NUR
NURSING NOTE

Pt was discharged to [facility] ([facility]) located at [address] [place] [id number]; ([phone]) aqt Pt was transported via Ambulance (Trip #[id number]) at 
1205. Pt is calm, cooperative, A&Ox3, anxious, flat affect, denies SI/HI,AVH at the time of 
discharge, vs stable. Pt was escorted out of the unit via gurney accompanied by 2 EMTs. Pt 
signed all paperwork, all belongings were returned to pt upon discharge. Discharge orders 
were obtained and pt is medically stable for dc. Report was given to Hailey MCCLELLAN, at [facility] and med recon was faxed over as well. Report was given to EMTs as well 
as paperwork.

## 2018-07-10 NOTE — NUR
SW called the pt's , Jose Sterling ([phone]) and went over the medicare letter 
of denial. Pt then informed the  that if he wanted to pay out of pocket then it would 
be over $1000/day.  agreed to the [facility] placement and stated 
that he wanted the SW to help create a connection to one of the pt's previous facilities 
located in [place], [place] called [facility].